# Patient Record
Sex: MALE | Race: BLACK OR AFRICAN AMERICAN | Employment: UNEMPLOYED | URBAN - METROPOLITAN AREA
[De-identification: names, ages, dates, MRNs, and addresses within clinical notes are randomized per-mention and may not be internally consistent; named-entity substitution may affect disease eponyms.]

---

## 2017-12-31 ENCOUNTER — HOSPITAL ENCOUNTER (EMERGENCY)
Age: 42
Discharge: HOME | End: 2017-12-31
Attending: EMERGENCY MEDICINE
Payer: SELF-PAY

## 2017-12-31 VITALS
WEIGHT: 196 LBS | HEART RATE: 93 BPM | BODY MASS INDEX: 28.06 KG/M2 | TEMPERATURE: 98.4 F | OXYGEN SATURATION: 100 % | RESPIRATION RATE: 15 BRPM | SYSTOLIC BLOOD PRESSURE: 150 MMHG | HEIGHT: 70 IN | DIASTOLIC BLOOD PRESSURE: 100 MMHG

## 2017-12-31 DIAGNOSIS — R05.9 COUGH: Primary | ICD-10-CM

## 2017-12-31 DIAGNOSIS — R19.7 DIARRHEA, UNSPECIFIED TYPE: ICD-10-CM

## 2017-12-31 PROCEDURE — 99282 EMERGENCY DEPT VISIT SF MDM: CPT

## 2017-12-31 RX ORDER — PSEUDOEPHEDRINE HCL 30 MG
30 TABLET ORAL
Qty: 20 TAB | Refills: 0 | Status: SHIPPED | OUTPATIENT
Start: 2017-12-31

## 2017-12-31 RX ORDER — LOPERAMIDE HCL 2 MG
2 TABLET ORAL
Qty: 20 TAB | Refills: 0 | Status: SHIPPED | OUTPATIENT
Start: 2017-12-31 | End: 2018-01-10

## 2017-12-31 NOTE — ED PROVIDER NOTES
EMERGENCY DEPARTMENT HISTORY AND PHYSICAL EXAM    8:51 AM      Date: 12/31/2017  Patient Name: Gwenn Runner    History of Presenting Illness     Chief Complaint   Patient presents with    Diarrhea    Nasal Congestion         History Provided By: Patient    Chief Complaint: Congestion  Duration: 1.5 Days  Timing:  Constant  Location: Nose  Quality: non-productive, clear mucus  Severity: 5 out of 10  Modifying Factors: none reported  Associated Symptoms: diarrhea with 3 episodes hourly      Additional History (Context): Gwenn Runner is a 43 y.o. male with No significant past medical history who presents with nasal congestion x1.5 days. Congestion is nasal, poorly productive with clear mucus. Associated sx include mild cough, mild N/V, and diarrhea for 1.5 days with hourly episodes. Pt denies blood or mucus in stool, abd pain/cramping, fever, chills, unusual foods, recent travel, abx. Possible ill-contact with worker with congestion. Pt uses tobacco. PSHx hip repair. PCP: None        Past History     Past Medical History:  History reviewed. No pertinent past medical history. Past Surgical History:  History reviewed. No pertinent surgical history. Family History:  History reviewed. No pertinent family history. Social History:  Social History   Substance Use Topics    Smoking status: Current Every Day Smoker    Smokeless tobacco: Never Used    Alcohol use None       Allergies: Allergies   Allergen Reactions    Vicodin [Hydrocodone-Acetaminophen] Other (comments)     hallucinations         Review of Systems       Review of Systems   Constitutional: Negative for activity change, fatigue and fever. HENT: Positive for congestion. Negative for rhinorrhea. Eyes: Negative for visual disturbance. Respiratory: Negative for shortness of breath. Cardiovascular: Negative for chest pain and palpitations. Gastrointestinal: Positive for diarrhea, nausea and vomiting.  Negative for abdominal pain and blood in stool. Genitourinary: Negative for dysuria and hematuria. Musculoskeletal: Negative for back pain. Skin: Negative for rash. Neurological: Negative for dizziness, weakness and light-headedness. Psychiatric/Behavioral: Negative for agitation. All other systems reviewed and are negative. Physical Exam     Visit Vitals    BP (!) 150/100 (BP 1 Location: Right arm, BP Patient Position: At rest)    Pulse 93    Temp 98.4 °F (36.9 °C)    Resp 15    Ht 5' 10\" (1.778 m)    Wt 88.9 kg (196 lb)    SpO2 100%    BMI 28.12 kg/m2       Physical Exam   Constitutional: He appears well-developed and well-nourished. HENT:   Head: Normocephalic and atraumatic. Eyes: Conjunctivae are normal. Pupils are equal, round, and reactive to light. Neck: Normal range of motion. Neck supple. Cardiovascular: Normal rate and regular rhythm. Pulmonary/Chest: Effort normal and breath sounds normal.   Abdominal: Soft. Bowel sounds are normal. There is no tenderness. There is no rigidity. No hernia. Musculoskeletal: Normal range of motion. Lymphadenopathy:     He has no cervical adenopathy. Neurological: He is alert. Skin: Skin is warm. Psychiatric: He has a normal mood and affect. Diagnostic Study Results         Medical Decision Making   I am the first provider for this patient. I reviewed the vital signs, available nursing notes, past medical history, past surgical history, family history and social history. Vital Signs-Reviewed the patient's vital signs.     Pulse Oximetry Analysis -  100% on room air (Interpretation) normal      Cardiac Monitor:  Rate: 93      Records Reviewed: Nursing Notes (Time of Review: 8:51 AM)    ED Course: Progress Notes, Reevaluation, and Consults:        Provider Notes (Medical Decision Making):     Pt with cough no chills no chest findings on PE also with loose stools and no mucus, blood, abd pain or tednerness Symptomatic treatment decongestant diarrhea medication and off work today. Diagnosis     Clinical Impression:   1. Cough    2. Diarrhea, unspecified type        Disposition: Discharge    Follow-up Information     Follow up With Details Comments [facility] ([address]) Call in 1 day Follow Up From Emergency Department [address] [facility] [address] [phone]           Patient's Medications   Start Taking    LOPERAMIDE (IMODIUM A-D) 2 MG TABLET    Take 1 Tab by mouth four (4) times daily as needed for Diarrhea for up to 10 days. PSEUDOEPHEDRINE (SUDAFED) 30 MG TABLET    Take 1 Tab by mouth every six (6) hours as needed for Congestion. Continue Taking    No medications on file   These Medications have changed    No medications on file   Stop Taking    No medications on file     _______________________________    Attestations:  [address] [place] [address] acting as a scribe for and in the presence of Ofe Jordan MD      December 31, 2017 at 8:58 AM       Provider Attestation:      I personally performed the services described in the documentation, reviewed the documentation, as recorded by the scribe in my presence, and it accurately and completely records my words and actions.  December 31, 2017 at 8:58 AM - Ofe Jordan MD    _______________________________

## 2017-12-31 NOTE — ED TRIAGE NOTES
Vomiting till yesterday. Started feeling sick Thursday. . Sent home from work x 2 days. Diarrhea.  URI symptoms

## 2017-12-31 NOTE — DISCHARGE INSTRUCTIONS
Cough: Care Instructions  Your Care Instructions    A cough is your body's response to something that bothers your throat or airways. Many things can cause a cough. You might cough because of a cold or the flu, bronchitis, or asthma. Smoking, postnasal drip, allergies, and stomach acid that backs up into your throat also can cause coughs. A cough is a symptom, not a disease. Most coughs stop when the cause, such as a cold, goes away. You can take a few steps at home to cough less and feel better. Follow-up care is a key part of your treatment and safety. Be sure to make and go to all appointments, and call your doctor if you are having problems. It's also a good idea to know your test results and keep a list of the medicines you take. How can you care for yourself at home? · Drink lots of water and other fluids. This helps thin the mucus and soothes a dry or sore throat. Honey or lemon juice in hot water or tea may ease a dry cough. · Take cough medicine as directed by your doctor. · Prop up your head on pillows to help you breathe and ease a dry cough. · Try cough drops to soothe a dry or sore throat. Cough drops don't stop a cough. Medicine-flavored cough drops are no better than candy-flavored drops or hard candy. · Do not smoke. Avoid secondhand smoke. If you need help quitting, talk to your doctor about stop-smoking programs and medicines. These can increase your chances of quitting for good. When should you call for help? Call 911 anytime you think you may need emergency care. For example, call if:  ? · You have severe trouble breathing. ?Call your doctor now or seek immediate medical care if:  ? · You cough up blood. ? · You have new or worse trouble breathing. ? · You have a new or higher fever. ? · You have a new rash. ? Watch closely for changes in your health, and be sure to contact your doctor if:  ? · You cough more deeply or more often, especially if you notice more mucus or a change in the color of your mucus. ? · You have new symptoms, such as a sore throat, an earache, or sinus pain. ? · You do not get better as expected. Where can you learn more? Go to http://angel-jonatan.info/. Enter D279 in the search box to learn more about \"Cough: Care Instructions. \"  Current as of: May 12, 2017  Content Version: 11.4  © 2169-8833 [employer]. Care instructions adapted under license by [employer] (which disclaims liability or warranty for this information). If you have questions about a medical condition or this instruction, always ask your healthcare professional. [place] [address] any warranty or liability for your use of this information. Diarrhea: Care Instructions  Your Care Instructions    Diarrhea is loose, watery stools (bowel movements). The exact cause is often hard to find. Sometimes diarrhea is your body's way of getting rid of what caused an upset stomach. Viruses, food poisoning, and many medicines can cause diarrhea. Some people get diarrhea in response to emotional stress, anxiety, or certain foods. Almost everyone has diarrhea now and then. It usually isn't serious, and your stools will return to normal soon. The important thing to do is replace the fluids you have lost, so you can prevent dehydration. The doctor has checked you carefully, but problems can develop later. If you notice any problems or new symptoms, get medical treatment right away. Follow-up care is a key part of your treatment and safety. Be sure to make and go to all appointments, and call your doctor if you are having problems. It's also a good idea to know your test results and keep a list of the medicines you take. How can you care for yourself at home? · Watch for signs of dehydration, which means your body has lost too much water. Dehydration is a serious condition and should be treated right away.  Signs of dehydration are:  ¨ Increasing thirst and dry eyes and mouth. ¨ Feeling faint or lightheaded. ¨ Darker urine, and a smaller amount of urine than normal.  · To prevent dehydration, drink plenty of fluids, enough so that your urine is light yellow or clear like water. Choose water and other caffeine-free clear liquids until you feel better. If you have kidney, heart, or liver disease and have to limit fluids, talk with your doctor before you increase the amount of fluids you drink. · Begin eating small amounts of mild foods the next day, if you feel like it. ¨ Try yogurt that has live cultures of Lactobacillus. (Check the label.)  ¨ Avoid spicy foods, fruits, alcohol, and caffeine until 48 hours after all symptoms are gone. ¨ Avoid chewing gum that contains sorbitol. ¨ Avoid dairy products (except for yogurt with Lactobacillus) while you have diarrhea and for 3 days after symptoms are gone. · The doctor may recommend that you take over-the-counter medicine, such as loperamide (Imodium), if you still have diarrhea after 6 hours. Read and follow all instructions on the label. Do not use this medicine if you have bloody diarrhea, a high fever, or other signs of serious illness. Call your doctor if you think you are having a problem with your medicine. When should you call for help? Call 911 anytime you think you may need emergency care. For example, call if:  ? · You passed out (lost consciousness). ? · Your stools are maroon or very bloody. ?Call your doctor now or seek immediate medical care if:  ? · You are dizzy or lightheaded, or you feel like you may faint. ? · Your stools are black and look like tar, or they have streaks of blood. ? · You have new or worse belly pain. ? · You have symptoms of dehydration, such as:  ¨ Dry eyes and a dry mouth. ¨ Passing only a little dark urine. ¨ Feeling thirstier than usual.   ? · You have a new or higher fever. ? Watch closely for changes in your health, and be sure to contact your doctor if:  ? · Your diarrhea is getting worse. ? · You see pus in the diarrhea. ? · You are not getting better after 2 days (48 hours). Where can you learn more? Go to http://angel-jonatan.info/. Enter J711 in the search box to learn more about \"Diarrhea: Care Instructions. \"  Current as of: March 20, 2017  Content Version: 11.4  © 1167-7111 [employer]. Care instructions adapted under license by [employer] (which disclaims liability or warranty for this information). If you have questions about a medical condition or this instruction, always ask your healthcare professional. [address] any warranty or liability for your use of this information.

## 2017-12-31 NOTE — LETTER
NOTIFICATION RETURN TO WORK / SCHOOL 
 
12/31/2017 8:55 AM 
 
Mr. Grace Dean [address] To Whom It May Concern: 
 
Grace Dean is currently under the care of [facility] EMERGENCY DEPT. He will return to work/school on: 01/01/18 If there are questions or concerns please have the patient contact our office. Sincerely, Ofe Jordan MD

## 2019-01-31 ENCOUNTER — APPOINTMENT (OUTPATIENT)
Dept: GENERAL RADIOLOGY | Age: 44
End: 2019-01-31
Attending: NURSE PRACTITIONER
Payer: SELF-PAY

## 2019-01-31 ENCOUNTER — HOSPITAL ENCOUNTER (EMERGENCY)
Age: 44
Discharge: HOME | End: 2019-01-31
Attending: EMERGENCY MEDICINE
Payer: SELF-PAY

## 2019-01-31 VITALS
RESPIRATION RATE: 18 BRPM | DIASTOLIC BLOOD PRESSURE: 94 MMHG | OXYGEN SATURATION: 100 % | TEMPERATURE: 98.5 F | HEART RATE: 87 BPM | HEIGHT: 70 IN | BODY MASS INDEX: 29.35 KG/M2 | SYSTOLIC BLOOD PRESSURE: 150 MMHG | WEIGHT: 205 LBS

## 2019-01-31 DIAGNOSIS — B34.9 VIRAL ILLNESS: Primary | ICD-10-CM

## 2019-01-31 DIAGNOSIS — R03.0 ELEVATED BLOOD PRESSURE READING: ICD-10-CM

## 2019-01-31 LAB
ANION GAP SERPL CALC-SCNC: 6 MMOL/L (ref 3–18)
BASOPHILS # BLD: 0 K/UL (ref 0–0.1)
BASOPHILS NFR BLD: 0 % (ref 0–2)
BUN SERPL-MCNC: 11 MG/DL (ref 7–18)
BUN/CREAT SERPL: 10 (ref 12–20)
CALCIUM SERPL-MCNC: 8.9 MG/DL (ref 8.5–10.1)
CHLORIDE SERPL-SCNC: 105 MMOL/L (ref 100–108)
CO2 SERPL-SCNC: 28 MMOL/L (ref 21–32)
CREAT SERPL-MCNC: 1.13 MG/DL (ref 0.6–1.3)
DIFFERENTIAL METHOD BLD: ABNORMAL
EOSINOPHIL # BLD: 0.1 K/UL (ref 0–0.4)
EOSINOPHIL NFR BLD: 1 % (ref 0–5)
ERYTHROCYTE [DISTWIDTH] IN BLOOD BY AUTOMATED COUNT: 14.7 % (ref 11.6–14.5)
GLUCOSE SERPL-MCNC: 102 MG/DL (ref 74–99)
HCT VFR BLD AUTO: 41.7 % (ref 36–48)
HGB BLD-MCNC: 13.9 G/DL (ref 13–16)
LYMPHOCYTES # BLD: 1.8 K/UL (ref 0.9–3.6)
LYMPHOCYTES NFR BLD: 26 % (ref 21–52)
MCH RBC QN AUTO: 28.6 PG (ref 24–34)
MCHC RBC AUTO-ENTMCNC: 33.3 G/DL (ref 31–37)
MCV RBC AUTO: 85.8 FL (ref 74–97)
MONOCYTES # BLD: 0.7 K/UL (ref 0.05–1.2)
MONOCYTES NFR BLD: 10 % (ref 3–10)
NEUTS SEG # BLD: 4.3 K/UL (ref 1.8–8)
NEUTS SEG NFR BLD: 63 % (ref 40–73)
PLATELET # BLD AUTO: 221 K/UL (ref 135–420)
PMV BLD AUTO: 10.2 FL (ref 9.2–11.8)
POTASSIUM SERPL-SCNC: 3.5 MMOL/L (ref 3.5–5.5)
RBC # BLD AUTO: 4.86 M/UL (ref 4.7–5.5)
SODIUM SERPL-SCNC: 139 MMOL/L (ref 136–145)
WBC # BLD AUTO: 6.8 K/UL (ref 4.6–13.2)

## 2019-01-31 PROCEDURE — 96374 THER/PROPH/DIAG INJ IV PUSH: CPT

## 2019-01-31 PROCEDURE — 85025 COMPLETE CBC W/AUTO DIFF WBC: CPT

## 2019-01-31 PROCEDURE — 74011250637 HC RX REV CODE- 250/637: Performed by: NURSE PRACTITIONER

## 2019-01-31 PROCEDURE — 99283 EMERGENCY DEPT VISIT LOW MDM: CPT

## 2019-01-31 PROCEDURE — 71046 X-RAY EXAM CHEST 2 VIEWS: CPT

## 2019-01-31 PROCEDURE — 80048 BASIC METABOLIC PNL TOTAL CA: CPT

## 2019-01-31 PROCEDURE — 74011250636 HC RX REV CODE- 250/636: Performed by: NURSE PRACTITIONER

## 2019-01-31 PROCEDURE — 96361 HYDRATE IV INFUSION ADD-ON: CPT

## 2019-01-31 RX ORDER — IBUPROFEN 600 MG/1
600 TABLET ORAL
Status: COMPLETED | OUTPATIENT
Start: 2019-01-31 | End: 2019-01-31

## 2019-01-31 RX ORDER — LOPERAMIDE HYDROCHLORIDE 2 MG/1
2 CAPSULE ORAL
Qty: 10 CAP | Refills: 0 | Status: SHIPPED | OUTPATIENT
Start: 2019-01-31

## 2019-01-31 RX ORDER — IBUPROFEN 800 MG/1
800 TABLET ORAL
Qty: 20 TAB | Refills: 0 | Status: SHIPPED | OUTPATIENT
Start: 2019-01-31 | End: 2019-02-07

## 2019-01-31 RX ORDER — ONDANSETRON 4 MG/1
4 TABLET, ORALLY DISINTEGRATING ORAL
Qty: 10 TAB | Refills: 0 | Status: SHIPPED | OUTPATIENT
Start: 2019-01-31

## 2019-01-31 RX ORDER — ONDANSETRON 2 MG/ML
4 INJECTION INTRAMUSCULAR; INTRAVENOUS
Status: COMPLETED | OUTPATIENT
Start: 2019-01-31 | End: 2019-01-31

## 2019-01-31 RX ADMIN — IBUPROFEN 600 MG: 600 TABLET ORAL at 11:11

## 2019-01-31 RX ADMIN — SODIUM CHLORIDE 1000 ML: 900 INJECTION, SOLUTION INTRAVENOUS at 11:10

## 2019-01-31 RX ADMIN — ONDANSETRON 4 MG: 2 INJECTION INTRAMUSCULAR; INTRAVENOUS at 11:10

## 2019-01-31 NOTE — ED PROVIDER NOTES
Gena is a 37 year odo male whio prsetns to [facility] ED with a c/o that he may have \"the flu. \"  States he has had chills but deneis fever, and has N&V and diarrhea as well for the past 2 to 3 days. Has not self medicated, and nothing has made it better or worse. No past medical history on file. No past surgical history on file. No family history on file. Social History Socioeconomic History  Marital status: LEGALLY  Spouse name: Not on file  Number of children: Not on file  Years of education: Not on file  Highest education level: Not on file Social Needs  Financial resource strain: Not on file  Food insecurity - worry: Not on file  Food insecurity - inability: Not on file  Transportation needs - medical: Not on file  Transportation needs - non-medical: Not on file Occupational History  Not on file Tobacco Use  Smoking status: Current Every Day Smoker  Smokeless tobacco: Never Used Substance and Sexual Activity  Alcohol use: Not on file  Drug use: Not on file  Sexual activity: Not on file Other Topics Concern  Not on file Social History Narrative  Not on file ALLERGIES: Vicodin [hydrocodone-acetaminophen] Review of Systems Constitutional: Positive for chills. HENT: Negative. Eyes: Negative. Respiratory: Negative. Cardiovascular: Negative. Gastrointestinal: Positive for diarrhea, nausea and vomiting. Endocrine: Negative. Genitourinary: Negative. Musculoskeletal: Negative. Skin: Negative. Allergic/Immunologic: Negative. Neurological: Negative. Hematological: Negative. Psychiatric/Behavioral: Negative. Vitals:  
 01/31/19 7333 BP: (!) 145/103 Pulse: (!) 102 Resp: 18 Temp: 98.5 °F (36.9 °C) SpO2: 100% Weight: 93 kg (205 lb) Height: 5' 10\" (1.778 m) Physical Exam  
 Constitutional: He appears well-developed and well-nourished. No distress. HENT:  
Head: Normocephalic and atraumatic. Nose: Nose normal.  
Eyes: EOM are normal. Right eye exhibits no discharge. Left eye exhibits no discharge. No scleral icterus. Neck: Normal range of motion. Neck supple. No tracheal deviation present. Cardiovascular: Normal rate, regular rhythm and intact distal pulses. Pulmonary/Chest: Effort normal and breath sounds normal. He has no wheezes. Abdominal: Soft. He exhibits no distension. There is no tenderness. There is no guarding. Genitourinary:  
Genitourinary Comments: NE 
  
Musculoskeletal: Normal range of motion. He exhibits no deformity. Neurological: He is alert. Coordination normal.  
Skin: Skin is warm and dry. NE. Psychiatric: He has a normal mood and affect. His behavior is normal.  
Nursing note and vitals reviewed. MDM Number of Diagnoses or Management Options Diagnosis management comments: PROGRESS NOTE:  One or more blood pressure readings were noted elevated during the Pt's presentation in the emergency department this date. This abnormal reading has been cited in the Pt's diagnosis, and they have been encouraged to follow up with their primary care physician, or referred to a consultant for further evaluation and treatment. Quique Oscar NP  1:21 PM 
 
PROGRESS NOTE:   Plain film reviewed. No acute findings   Quique Oscar NP  1:22 PM 
 
MDM:  The Pt appears to have viral illness. Blood work and XR normal.  Will send home with Imodium and Zofran as well as NSAID for comfort. Will encourage forcing fluids. Quique Oscar NP  1:22 PM 
 
 
 
 
 
  
Amount and/or Complexity of Data Reviewed Clinical lab tests: ordered and reviewed Tests in the radiology section of CPT®: ordered and reviewed Risk of Complications, Morbidity, and/or Mortality Presenting problems: low Diagnostic procedures: low Procedures Diagnosis: 1. Viral illness 2. Elevated blood pressure reading Disposition:   Discharged to Home Follow-up Information Follow up With Specialties Details Why Contact Samir Mcfadden  Call today to arrange follow up    [address] [place] [address] [address] [facility]) [address] [phone] Medication List  
  
ASK your doctor about these medications   
pseudoephedrine 30 mg tablet Commonly known as:  SUDAFED Take 1 Tab by mouth every six (6) hours as needed for Congestion.

## 2019-01-31 NOTE — LETTER
[address] [facility] EMERGENCY DEPT 
[address] [facility] [address] [facility] [address] [id number] 
[phone] Work/School Note Date: 1/31/2019 To Whom It May concern: 
 
Ainsley Anderson was seen and treated today in the emergency room by the following provider(s): 
Attending Provider: Nic Mims MD 
Nurse Practitioner: Leopoldo Freeze, NP. Ainsley Anderson may return to work on 2/2/19. Sincerely, Hardy Morales RN

## 2019-01-31 NOTE — DISCHARGE INSTRUCTIONS
Patient Education        Viral Infections: Care Instructions  Your Care Instructions    You don't feel well, but it's not clear what's causing it. You may have a viral infection. Viruses cause many illnesses, such as the common cold, influenza, fever, rashes, and the diarrhea, nausea, and vomiting that are often called \"stomach flu. \" You may wonder if antibiotic medicines could make you feel better. But antibiotics only treat infections caused by bacteria. They don't work on viruses. The good news is that viral infections usually aren't serious. Most will go away in a few days without medical treatment. In the meantime, there are a few things you can do to make yourself more comfortable. Follow-up care is a key part of your treatment and safety. Be sure to make and go to all appointments, and call your doctor if you are having problems. It's also a good idea to know your test results and keep a list of the medicines you take. How can you care for yourself at home? · Get plenty of rest if you feel tired. · Take an over-the-counter pain medicine if needed, such as acetaminophen (Tylenol), ibuprofen (Advil, Motrin), or naproxen (Aleve). Read and follow all instructions on the label. · Be careful when taking over-the-counter cold or flu medicines and Tylenol at the same time. Many of these medicines have acetaminophen, which is Tylenol. Read the labels to make sure that you are not taking more than the recommended dose. Too much acetaminophen (Tylenol) can be harmful. · Drink plenty of fluids, enough so that your urine is light yellow or clear like water. If you have kidney, heart, or liver disease and have to limit fluids, talk with your doctor before you increase the amount of fluids you drink. · Stay home from work, school, and other public places while you have a fever. When should you call for help? Call 911 anytime you think you may need emergency care.  For example, call if:    · You have severe trouble breathing.     · You passed out (lost consciousness).    Call your doctor now or seek immediate medical care if:    · You seem to be getting much sicker.     · You have a new or higher fever.     · You have blood in your stools.     · You have new belly pain, or your pain gets worse.     · You have a new rash.    Watch closely for changes in your health, and be sure to contact your doctor if:    · You start to get better and then get worse.     · You do not get better as expected. Where can you learn more? Go to http://angel-jonatan.info/. Enter F488 in the search box to learn more about \"Viral Infections: Care Instructions. \"  Current as of: July 30, 2018  Content Version: 11.9  © 8027-8480 [employer]. Care instructions adapted under license by [employer] (which disclaims liability or warranty for this information). If you have questions about a medical condition or this instruction, always ask your healthcare professional. [place] [address] any warranty or liability for your use of this information. Patient Education        Elevated Blood Pressure: Care Instructions  Your Care Instructions    Blood pressure is a measure of how hard the blood pushes against the walls of your arteries. It's normal for blood pressure to go up and down throughout the day. But if it stays up over time, you have high blood pressure. Two numbers tell you your blood pressure. The first number is the systolic pressure. It shows how hard the blood pushes when your heart is pumping. The second number is the diastolic pressure. It shows how hard the blood pushes between heartbeats, when your heart is relaxed and filling with blood. An ideal blood pressure in adults is less than 120/80 (say \"120 over 80\"). High blood pressure is 140/90 or higher. You have high blood pressure if your top number is 140 or higher or your bottom number is 90 or higher, or both.   The main test for high blood pressure is simple, fast, and painless. To diagnose high blood pressure, your doctor will test your blood pressure at different times. After testing your blood pressure, your doctor may ask you to test it again when you are home. If you are diagnosed with high blood pressure, you can work with your doctor to make a long-term plan to manage it. Follow-up care is a key part of your treatment and safety. Be sure to make and go to all appointments, and call your doctor if you are having problems. It's also a good idea to know your test results and keep a list of the medicines you take. How can you care for yourself at home? · Do not smoke. Smoking increases your risk for heart attack and stroke. If you need help quitting, talk to your doctor about stop-smoking programs and medicines. These can increase your chances of quitting for good. · Stay at a healthy weight. · Try to limit how much sodium you eat to less than 2,300 milligrams (mg) a day. Your doctor may ask you to try to eat less than 1,500 mg a day. · Be physically active. Get at least 30 minutes of exercise on most days of the week. Walking is a good choice. You also may want to do other activities, such as running, swimming, cycling, or playing tennis or team sports. · Avoid or limit alcohol. Talk to your doctor about whether you can drink any alcohol. · Eat plenty of fruits, vegetables, and low-fat dairy products. Eat less saturated and total fats. · Learn how to check your blood pressure at home. When should you call for help? Call your doctor now or seek immediate medical care if:  ? · Your blood pressure is much higher than normal (such as 180/110 or higher). ? · You think high blood pressure is causing symptoms such as:  ¨ Severe headache. ¨ Blurry vision. ? Watch closely for changes in your health, and be sure to contact your doctor if:  ? · You do not get better as expected. Where can you learn more?   Go to http://angel-jonatan.info/. Enter V602 in the search box to learn more about \"Elevated Blood Pressure: Care Instructions. \"  Current as of: 2016  Content Version: 11.4  © 5897-6458 [employer]. Care instructions adapted under license by [employer] (which disclaims liability or warranty for this information). If you have questions about a medical condition or this instruction, always ask your healthcare professional. [place] [address] any warranty or liability for your use of this information. [employer] Activation    Thank you for requesting access to [employer]. Please follow the instructions below to securely access and download your online medical record. [employer] allows you to send messages to your doctor, view your test results, renew your prescriptions, schedule appointments, and more. How Do I Sign Up? 1. In your internet browser, go to [email or web address]  2. Click on the First Time User? Click Here link in the Sign In box. You will be redirect to the New Member Sign Up page. 3. Enter your [employer] Access Code exactly as it appears below. You will not need to use this code after youve completed the sign-up process. If you do not sign up before the expiration date, you must request a new code. Tales2[employer] Access Code: [email or web address]  Expires: 3/17/2019  9:53 AM (This is the date your [employer] access code will )    4. Enter the last four digits of your Social Security Number (xxxx) and Date of Birth (mm/dd/yyyy) as indicated and click Submit. You will be taken to the next sign-up page. 5. Create a Tales2Go ID. This will be your [employer] login ID and cannot be changed, so think of one that is secure and easy to remember. 6. Create a [employer] password. You can change your password at any time. 7. Enter your Password Reset Question and Answer. This can be used at a later time if you forget your password.    8. Enter your e-mail address. You will receive e-mail notification when new information is available in [address] 9. Click Sign Up. You can now view and download portions of your medical record. 10. Click the Download Summary menu link to download a portable copy of your medical information. Additional Information    If you have questions, please visit the Frequently Asked Questions section of the [employer] website at [email or web address] Remember, [employer] is NOT to be used for urgent needs. For medical emergencies, dial 911. Discharge Review:    1. The reason for my/the patient presentation in the  Emergency Department today has been evaluated by            _____ Yes     _____ No  the medical provider to my satisfaction. 2.  The results of studies such as X-Rays and Laboratory  work have been discussed with me/the patient.                       _____ Yes     _____No    3. I have discussed the discharge diagnosis with the         Medical provider and I understand and agree with the   aftercare plan.                      _____Yes     _____No      Signature  __________________________________________________________                                Keep well hydrated. Drink at least 2 to 3 liters of fresh water daily.